# Patient Record
Sex: MALE | Race: WHITE | NOT HISPANIC OR LATINO | URBAN - METROPOLITAN AREA
[De-identification: names, ages, dates, MRNs, and addresses within clinical notes are randomized per-mention and may not be internally consistent; named-entity substitution may affect disease eponyms.]

---

## 2019-08-26 ENCOUNTER — IMPORTED ENCOUNTER (OUTPATIENT)
Dept: URBAN - METROPOLITAN AREA CLINIC 31 | Facility: CLINIC | Age: 24
End: 2019-08-26

## 2019-08-26 PROBLEM — H18.601: Noted: 2019-08-26

## 2019-08-26 PROCEDURE — 92025 CPTRIZED CORNEAL TOPOGRAPHY: CPT

## 2019-08-26 PROCEDURE — 92004 COMPRE OPH EXAM NEW PT 1/>: CPT

## 2019-08-26 NOTE — PATIENT DISCUSSION
1. Keratoconus OD -  can be caused by eye rubbing or can be inherited. Explained importance of no eye rubbing to prevent progression. Consult with FP for possible CCL. 2.  Refractive error - hold on scleral lens until after cornea consult. 3. Return for an appointment in 1 month for cornea evaluation. with Dr. Noah Moss.

## 2019-09-04 ENCOUNTER — IMPORTED ENCOUNTER (OUTPATIENT)
Dept: URBAN - METROPOLITAN AREA CLINIC 31 | Facility: CLINIC | Age: 24
End: 2019-09-04

## 2019-09-04 PROBLEM — H18.621: Noted: 2019-09-04

## 2019-09-04 PROCEDURE — 99214 OFFICE O/P EST MOD 30 MIN: CPT

## 2019-09-04 NOTE — PATIENT DISCUSSION
Keratoconus can be caused by eye rubbing or can be inherited. Explained importance of no eye rubbing to prevent progression. Condition is worsening. Treatment options discussed include glasses contact lens  and collagen cross linking. Risks and benefits of procedure reviewed including infection and haze.  Schedule CCX OD get copies of old records to document progression

## 2019-10-09 ENCOUNTER — IMPORTED ENCOUNTER (OUTPATIENT)
Dept: URBAN - METROPOLITAN AREA CLINIC 31 | Facility: CLINIC | Age: 24
End: 2019-10-09

## 2019-10-09 PROBLEM — H18.621: Noted: 2019-10-09

## 2019-10-09 PROCEDURE — 92071 CONTACT LENS FITTING FOR TX: CPT

## 2019-10-09 NOTE — PATIENT DISCUSSION
Collagen cross linking procedure performed without difficulty. BCL placed. Postop drops and instruction sheet reviewed with patient. Call with any problems.   Postop visit in 4-7 days

## 2019-10-15 ENCOUNTER — IMPORTED ENCOUNTER (OUTPATIENT)
Dept: URBAN - METROPOLITAN AREA CLINIC 31 | Facility: CLINIC | Age: 24
End: 2019-10-15

## 2019-10-15 PROBLEM — Z98.89: Noted: 2019-10-15

## 2019-10-15 PROCEDURE — 99024 POSTOP FOLLOW-UP VISIT: CPT

## 2019-10-15 NOTE — PATIENT DISCUSSION
1.  Post Operative Collagen cross linking OD : Doing well BCL removed without incident. DC Vigamox and Prolensa continue Durezol QID and tears prn Call with any problems. 2. Return for an appointment in 2 weeks for post op exam. with Dr. Kemi Duenas.

## 2019-11-01 ENCOUNTER — IMPORTED ENCOUNTER (OUTPATIENT)
Dept: URBAN - METROPOLITAN AREA CLINIC 31 | Facility: CLINIC | Age: 24
End: 2019-11-01

## 2019-11-01 PROBLEM — Z98.89: Noted: 2019-11-01

## 2019-11-01 PROCEDURE — 99024 POSTOP FOLLOW-UP VISIT: CPT

## 2019-11-01 NOTE — PATIENT DISCUSSION
Post Operative: Doing well po drops as instructed. tears prn Call with any problems. Taper PRED 3x/d for a week then 2x/d for a week then change to Lotemax 2x/dReturn for an appointment for post op exam. ARx MRx and Topography. with Dr. Barbar Castleman.

## 2019-12-06 ENCOUNTER — IMPORTED ENCOUNTER (OUTPATIENT)
Dept: URBAN - METROPOLITAN AREA CLINIC 31 | Facility: CLINIC | Age: 24
End: 2019-12-06

## 2019-12-06 PROBLEM — Z98.89: Noted: 2019-12-06

## 2019-12-06 PROCEDURE — 99024 POSTOP FOLLOW-UP VISIT: CPT

## 2019-12-06 PROCEDURE — 92025 CPTRIZED CORNEAL TOPOGRAPHY: CPT

## 2019-12-06 NOTE — PATIENT DISCUSSION
Post Operative: Doing well po drops as instructed. lotemax 2x/d for 2 weeks then qd  tears prn Call with any problems. Return for an appointment in 1 month for office call. MRx and Topography. with Dr. Eri Miranda.

## 2020-01-17 ENCOUNTER — IMPORTED ENCOUNTER (OUTPATIENT)
Dept: URBAN - METROPOLITAN AREA CLINIC 31 | Facility: CLINIC | Age: 25
End: 2020-01-17

## 2020-01-17 PROBLEM — Z98.89: Noted: 2020-01-17

## 2020-01-17 PROCEDURE — 99213 OFFICE O/P EST LOW 20 MIN: CPT

## 2020-01-17 PROCEDURE — 92025 CPTRIZED CORNEAL TOPOGRAPHY: CPT

## 2020-02-28 ENCOUNTER — IMPORTED ENCOUNTER (OUTPATIENT)
Dept: URBAN - METROPOLITAN AREA CLINIC 31 | Facility: CLINIC | Age: 25
End: 2020-02-28

## 2020-02-28 PROCEDURE — 99213 OFFICE O/P EST LOW 20 MIN: CPT

## 2020-02-28 PROCEDURE — 92025 CPTRIZED CORNEAL TOPOGRAPHY: CPT

## 2020-02-28 NOTE — PATIENT DISCUSSION
Keratoconus OD - s/p CCX vandana shows astigmatism becoming more regular. Improving.  monitor for changeReturn for an appointment in 6 weeks for office call. MRx and Topography. with Dr. Say Whiting.

## 2020-03-02 PROBLEM — H18.601: Noted: 2020-03-02

## 2020-04-27 ENCOUNTER — IMPORTED ENCOUNTER (OUTPATIENT)
Dept: URBAN - METROPOLITAN AREA CLINIC 31 | Facility: CLINIC | Age: 25
End: 2020-04-27

## 2020-04-27 PROBLEM — H18.601: Noted: 2020-04-27

## 2020-04-27 PROCEDURE — 99213 OFFICE O/P EST LOW 20 MIN: CPT

## 2020-04-27 PROCEDURE — 92025 CPTRIZED CORNEAL TOPOGRAPHY: CPT

## 2020-04-27 NOTE — PATIENT DISCUSSION
/Keratoconus OD -  S/P CCX 10/09/2019can be caused by eye rubbing or can be inherited. Explained importance of no eye rubbing to prevent progression. Continues to improve after treatment t/c glasses next visit. DC lotemax when goneReturn for an appointment in 1 month for office call. MRx and Topography. with Dr. Son Fisher.

## 2020-06-12 ENCOUNTER — IMPORTED ENCOUNTER (OUTPATIENT)
Dept: URBAN - METROPOLITAN AREA CLINIC 31 | Facility: CLINIC | Age: 25
End: 2020-06-12

## 2020-06-12 PROBLEM — H18.601: Noted: 2020-06-12

## 2020-06-12 PROCEDURE — 92025 CPTRIZED CORNEAL TOPOGRAPHY: CPT

## 2020-06-12 PROCEDURE — 92015 DETERMINE REFRACTIVE STATE: CPT

## 2020-06-12 PROCEDURE — 99213 OFFICE O/P EST LOW 20 MIN: CPT

## 2020-06-12 NOTE — PATIENT DISCUSSION
Keratoconus OD -  s/p CCX doing better. Will give glasses. Return for an appointment for office call. MRx and Topography. 4-6 months. with Dr. Bryce Abbott.

## 2020-10-16 ENCOUNTER — IMPORTED ENCOUNTER (OUTPATIENT)
Dept: URBAN - METROPOLITAN AREA CLINIC 31 | Facility: CLINIC | Age: 25
End: 2020-10-16

## 2020-10-16 PROBLEM — H18.601: Noted: 2020-10-16

## 2020-10-16 PROCEDURE — 99213 OFFICE O/P EST LOW 20 MIN: CPT

## 2020-10-16 PROCEDURE — 92025 CPTRIZED CORNEAL TOPOGRAPHY: CPT

## 2020-10-16 NOTE — PATIENT DISCUSSION
Keratoconus OD -  s/p CCLx vandana shows continued improvement in corneal shape and regularity. Trace haze restart PRED qd. Return for an appointment in 1 month for office call. with Dr. Cecil Salter.

## 2020-12-04 ENCOUNTER — IMPORTED ENCOUNTER (OUTPATIENT)
Dept: URBAN - METROPOLITAN AREA CLINIC 31 | Facility: CLINIC | Age: 25
End: 2020-12-04

## 2020-12-04 PROBLEM — H18.601: Noted: 2020-12-04

## 2020-12-04 PROCEDURE — 99213 OFFICE O/P EST LOW 20 MIN: CPT

## 2020-12-04 NOTE — PATIENT DISCUSSION
Keratoconus OD -  s/p CCLx   still with haze increase pred to 2x/dReturn for an appointment in 1 month for office call. with Dr. Krystina Shaffer.  MRX

## 2021-02-19 ENCOUNTER — IMPORTED ENCOUNTER (OUTPATIENT)
Dept: URBAN - METROPOLITAN AREA CLINIC 31 | Facility: CLINIC | Age: 26
End: 2021-02-19

## 2021-02-19 PROBLEM — H18.601: Noted: 2021-02-19

## 2021-02-19 PROCEDURE — 99213 OFFICE O/P EST LOW 20 MIN: CPT

## 2021-02-19 PROCEDURE — 92025 CPTRIZED CORNEAL TOPOGRAPHY: CPT

## 2021-02-19 PROCEDURE — 92015 DETERMINE REFRACTIVE STATE: CPT

## 2021-02-19 NOTE — PATIENT DISCUSSION
Keratoconus OD -  s/p CCLx   still with haze decrease pred to 1x/d for 1 more month then stop. [facility] next monthReturn for an appointment        office call. with Dr. Azra Anthony. MRX vandana when back in [facility] to go into [facility] 3/11/2021.

## 2022-04-02 ASSESSMENT — TONOMETRY
OD_IOP_MMHG: 15
OD_IOP_MMHG: 17
OD_IOP_MMHG: 12
OS_IOP_MMHG: 17
OD_IOP_MMHG: 13
OD_IOP_MMHG: 15
OS_IOP_MMHG: 19
OD_IOP_MMHG: 14
OD_IOP_MMHG: 14

## 2022-04-02 ASSESSMENT — VISUAL ACUITY
OD_SC: 20/40+1
OS_CC: 20/20
OD_CC: 20/60
OS_CC: 20/20
OS_SC: 20/20
OD_CC: 20/40
OD_PH: SC 20/30 +2
OS_CC: 20/20
OD_PH: CC 20/25 -1
OD_CC: 20/40-2
OD_PH: 20/40
OD_CC: 20/50-2
OS_CC: 20/20
OS_CC: 20/30
OD_CC: 20/50+1
OS_CC: 20/20
OS_CC: 20/20-1
OD_CC: 20/50-1
OS_SC: 20/15-1
OD_CC: 20/40+1
OD_PH: SC 20/20 -3
OD_SC: 20/30
OD_PH: SC 20/30
OS_CC: 20/20
OD_CC: 20/60-1
OD_CC: 20/40-2
OS_CC: 20/20
OD_CC: 20/40-2
OS_CC: 20/20
OS_CC: 20/20
OD_SC: 20/40+2
OD_PH: SC 20/25 -1
OD_CC: 20/30-2
OD_PH: SC 20/30 +2
OS_SC: 20/20
OD_PH: SC 20/30 -1
OD_PH: SC 20/30 -1